# Patient Record
Sex: MALE | Race: BLACK OR AFRICAN AMERICAN | NOT HISPANIC OR LATINO | Employment: OTHER | URBAN - METROPOLITAN AREA
[De-identification: names, ages, dates, MRNs, and addresses within clinical notes are randomized per-mention and may not be internally consistent; named-entity substitution may affect disease eponyms.]

---

## 2017-12-21 PROBLEM — I10 ESSENTIAL HYPERTENSION: Status: ACTIVE | Noted: 2017-12-21

## 2018-03-07 PROBLEM — E78.5 HYPERLIPIDEMIA: Status: ACTIVE | Noted: 2018-03-07

## 2018-03-07 PROBLEM — E11.9 TYPE 2 DIABETES MELLITUS: Status: ACTIVE | Noted: 2018-03-07

## 2018-06-13 PROBLEM — Z00.00 ANNUAL PHYSICAL EXAM: Status: ACTIVE | Noted: 2018-06-13

## 2018-06-13 PROBLEM — R01.0 BENIGN HEART MURMUR: Status: ACTIVE | Noted: 2018-06-13

## 2018-06-13 PROBLEM — Z12.11 SCREENING FOR COLON CANCER: Status: ACTIVE | Noted: 2018-06-13

## 2018-06-13 PROBLEM — Z11.59 ENCOUNTER FOR HEPATITIS C SCREENING TEST FOR LOW RISK PATIENT: Status: ACTIVE | Noted: 2018-06-13

## 2018-06-13 PROBLEM — R05.9 COUGH: Status: ACTIVE | Noted: 2018-06-13

## 2018-06-13 PROBLEM — E04.9 GOITER: Status: ACTIVE | Noted: 2018-06-13

## 2018-06-13 PROBLEM — Z01.89 ENCOUNTER FOR OTHER SPECIFIED SPECIAL EXAMINATIONS: Status: ACTIVE | Noted: 2018-06-13

## 2018-06-13 PROBLEM — Z12.5 SCREENING FOR PROSTATE CANCER: Status: ACTIVE | Noted: 2018-06-13

## 2018-06-13 PROBLEM — Z13.6 ENCOUNTER FOR SCREENING FOR CARDIOVASCULAR DISORDERS: Status: ACTIVE | Noted: 2018-06-13

## 2018-06-25 PROBLEM — E04.1 THYROID NODULE: Status: ACTIVE | Noted: 2018-06-25

## 2018-06-27 PROBLEM — E87.6 HYPOKALEMIA: Status: ACTIVE | Noted: 2018-06-27

## 2018-09-17 PROBLEM — Z13.6 ENCOUNTER FOR SCREENING FOR CARDIOVASCULAR DISORDERS: Status: RESOLVED | Noted: 2018-06-13 | Resolved: 2018-09-17

## 2018-09-17 PROBLEM — Z00.00 ANNUAL PHYSICAL EXAM: Status: RESOLVED | Noted: 2018-06-13 | Resolved: 2018-09-17

## 2019-01-03 PROBLEM — J30.2 SEASONAL ALLERGIC RHINITIS: Status: ACTIVE | Noted: 2019-01-03

## 2019-01-03 PROBLEM — M25.522 ELBOW PAIN, LEFT: Status: ACTIVE | Noted: 2019-01-03

## 2019-01-05 PROBLEM — R80.9 PROTEINURIA: Status: ACTIVE | Noted: 2019-01-05

## 2019-01-05 PROBLEM — N18.1 STAGE 1 CHRONIC KIDNEY DISEASE: Status: ACTIVE | Noted: 2019-01-05

## 2019-05-05 PROBLEM — E87.6 POTASSIUM SERUM DECREASED: Status: ACTIVE | Noted: 2019-05-05

## 2019-08-23 PROBLEM — Z01.89 ENCOUNTER FOR OTHER SPECIFIED SPECIAL EXAMINATIONS: Status: RESOLVED | Noted: 2018-06-13 | Resolved: 2019-08-23

## 2019-08-23 PROBLEM — Z11.59 ENCOUNTER FOR HEPATITIS C SCREENING TEST FOR LOW RISK PATIENT: Status: RESOLVED | Noted: 2018-06-13 | Resolved: 2019-08-23

## 2019-08-23 PROBLEM — Z12.11 SCREENING FOR COLON CANCER: Status: RESOLVED | Noted: 2018-06-13 | Resolved: 2019-08-23

## 2019-08-23 PROBLEM — R05.9 COUGH: Status: RESOLVED | Noted: 2018-06-13 | Resolved: 2019-08-23

## 2019-08-23 PROBLEM — E87.6 POTASSIUM SERUM DECREASED: Status: RESOLVED | Noted: 2019-05-05 | Resolved: 2019-08-23

## 2019-08-23 PROBLEM — Z12.5 SCREENING FOR PROSTATE CANCER: Status: RESOLVED | Noted: 2018-06-13 | Resolved: 2019-08-23

## 2019-08-23 PROBLEM — Z00.00 ANNUAL PHYSICAL EXAM: Status: RESOLVED | Noted: 2018-06-13 | Resolved: 2019-08-23

## 2019-08-23 PROBLEM — E04.1 THYROID NODULE: Status: RESOLVED | Noted: 2018-06-25 | Resolved: 2019-08-23

## 2019-08-23 PROBLEM — E87.6 HYPOKALEMIA: Status: RESOLVED | Noted: 2018-06-27 | Resolved: 2019-08-23

## 2019-08-23 PROBLEM — M25.522 ELBOW PAIN, LEFT: Status: RESOLVED | Noted: 2019-01-03 | Resolved: 2019-08-23

## 2020-01-13 PROBLEM — E11.9 DIABETES MELLITUS WITHOUT COMPLICATION: Chronic | Status: ACTIVE | Noted: 2020-01-13

## 2023-04-27 ENCOUNTER — CLINICAL SUPPORT (OUTPATIENT)
Dept: PULMONOLOGY | Facility: CLINIC | Age: 73
End: 2023-04-27
Payer: MEDICARE

## 2023-04-27 DIAGNOSIS — R06.2 WHEEZING: ICD-10-CM

## 2023-04-27 LAB
BRPFT: ABNORMAL
DLCO ADJ PRE: 23.01 ML/(MIN*MMHG) (ref 18.07–31.93)
DLCO SINGLE BREATH LLN: 18.07
DLCO SINGLE BREATH PRE REF: 92.1 %
DLCO SINGLE BREATH REF: 25
DLCOC SBVA LLN: 2.5
DLCOC SBVA PRE REF: 148.4 %
DLCOC SBVA REF: 3.71
DLCOC SINGLE BREATH LLN: 18.07
DLCOC SINGLE BREATH PRE REF: 92.1 %
DLCOC SINGLE BREATH REF: 25
DLCOVA LLN: 2.5
DLCOVA PRE REF: 148.4 %
DLCOVA PRE: 5.5 ML/(MIN*MMHG*L) (ref 2.5–4.92)
DLCOVA REF: 3.71
DLVAADJ PRE: 5.5 ML/(MIN*MMHG*L) (ref 2.5–4.92)
ERV LLN: -16449.03
ERV PRE REF: 15.1 %
ERV REF: 0.97
FEF 25 75 LLN: 0.64
FEF 25 75 PRE REF: 112.3 %
FEF 25 75 REF: 1.9
FEV1 FVC LLN: 63
FEV1 FVC PRE REF: 99.7 %
FEV1 FVC REF: 76
FEV1 LLN: 1.68
FEV1 PRE REF: 103.6 %
FEV1 REF: 2.48
FRCPLETH LLN: 2.63
FRCPLETH PREREF: 72.3 %
FRCPLETH REF: 3.62
FVC LLN: 2.33
FVC PRE REF: 103.6 %
FVC REF: 3.26
IVC PRE: 2.8 L (ref 2.33–4.19)
IVC SINGLE BREATH LLN: 2.33
IVC SINGLE BREATH PRE REF: 85.8 %
IVC SINGLE BREATH REF: 3.26
MVV LLN: 96
MVV PRE REF: 85.6 %
MVV REF: 113
PEF LLN: 4.91
PEF PRE REF: 102.5 %
PEF REF: 7.43
PRE DLCO: 23.01 ML/(MIN*MMHG) (ref 18.07–31.93)
PRE ERV: 0.15 L (ref -16449.03–16450.97)
PRE FEF 25 75: 2.14 L/S (ref 0.64–3.16)
PRE FET 100: 11.73 SEC
PRE FEV1 FVC: 76.21 % (ref 62.95–89.9)
PRE FEV1: 2.57 L (ref 1.68–3.29)
PRE FRC PL: 2.61 L
PRE FVC: 3.38 L (ref 2.33–4.19)
PRE MVV: 97 L/MIN (ref 96.29–130.27)
PRE PEF: 7.61 L/S (ref 4.91–9.94)
PRE RV: 2.32 L (ref 1.97–3.32)
PRE TLC: 5.72 L (ref 5.59–7.89)
RAW LLN: 3.06
RAW PRE REF: 97.5 %
RAW PRE: 2.98 CMH2O*S/L (ref 3.06–3.06)
RAW REF: 3.06
RV LLN: 1.97
RV PRE REF: 87.7 %
RV REF: 2.64
RVTLC LLN: 33
RVTLC PRE REF: 95.5 %
RVTLC PRE: 40.5 % (ref 33.45–51.41)
RVTLC REF: 42
TLC LLN: 5.59
TLC PRE REF: 84.9 %
TLC REF: 6.74
VA PRE: 4.19 L (ref 6.59–6.59)
VA SINGLE BREATH LLN: 6.59
VA SINGLE BREATH PRE REF: 63.5 %
VA SINGLE BREATH REF: 6.59
VC LLN: 2.33
VC PRE REF: 104.4 %
VC PRE: 3.4 L (ref 2.33–4.19)
VC REF: 3.26
VTGRAWPRE: 2.95 L

## 2023-04-27 PROCEDURE — 94010 BREATHING CAPACITY TEST: CPT | Mod: 26,S$PBB,, | Performed by: INTERNAL MEDICINE

## 2023-04-27 PROCEDURE — 94729 DIFFUSING CAPACITY: CPT | Mod: PBBFAC

## 2023-04-27 PROCEDURE — 94010 BREATHING CAPACITY TEST: CPT | Mod: PBBFAC

## 2023-04-27 PROCEDURE — 94726 PLETHYSMOGRAPHY LUNG VOLUMES: CPT | Mod: PBBFAC

## 2023-04-27 PROCEDURE — 94726 PULM FUNCT TST PLETHYSMOGRAP: ICD-10-PCS | Mod: 26,S$PBB,, | Performed by: INTERNAL MEDICINE

## 2023-04-27 PROCEDURE — 94010 BREATHING CAPACITY TEST: ICD-10-PCS | Mod: 26,S$PBB,, | Performed by: INTERNAL MEDICINE

## 2023-04-27 PROCEDURE — 94729 DIFFUSING CAPACITY: CPT | Mod: 26,S$PBB,, | Performed by: INTERNAL MEDICINE

## 2023-04-27 PROCEDURE — 94729 PR C02/MEMBANE DIFFUSE CAPACITY: ICD-10-PCS | Mod: 26,S$PBB,, | Performed by: INTERNAL MEDICINE

## 2023-04-27 PROCEDURE — 94726 PLETHYSMOGRAPHY LUNG VOLUMES: CPT | Mod: 26,S$PBB,, | Performed by: INTERNAL MEDICINE

## 2023-10-23 PROBLEM — E55.9 VITAMIN D DEFICIENCY: Status: ACTIVE | Noted: 2023-10-23

## 2023-10-23 PROBLEM — N18.31 CHRONIC KIDNEY DISEASE, STAGE 3A: Status: ACTIVE | Noted: 2023-10-23

## 2023-11-14 PROBLEM — M47.816 SPONDYLOSIS OF LUMBAR REGION WITHOUT MYELOPATHY OR RADICULOPATHY: Status: ACTIVE | Noted: 2023-11-14

## 2025-03-17 DIAGNOSIS — M25.512 LEFT SHOULDER PAIN, UNSPECIFIED CHRONICITY: Primary | ICD-10-CM

## 2025-03-18 ENCOUNTER — HOSPITAL ENCOUNTER (OUTPATIENT)
Dept: RADIOLOGY | Facility: HOSPITAL | Age: 75
Discharge: HOME | End: 2025-03-18
Attending: STUDENT IN AN ORGANIZED HEALTH CARE EDUCATION/TRAINING PROGRAM
Payer: MEDICARE

## 2025-03-18 ENCOUNTER — OFFICE VISIT (OUTPATIENT)
Dept: SPORTS MEDICINE | Facility: CLINIC | Age: 75
End: 2025-03-18
Payer: MEDICARE

## 2025-03-18 VITALS — WEIGHT: 198.63 LBS | BODY MASS INDEX: 30.1 KG/M2 | HEIGHT: 68 IN

## 2025-03-18 DIAGNOSIS — M25.512 LEFT SHOULDER PAIN, UNSPECIFIED CHRONICITY: ICD-10-CM

## 2025-03-18 DIAGNOSIS — M67.912 TENDINOPATHY OF LEFT ROTATOR CUFF: ICD-10-CM

## 2025-03-18 DIAGNOSIS — M75.42 SUBACROMIAL IMPINGEMENT OF LEFT SHOULDER: Primary | ICD-10-CM

## 2025-03-18 PROCEDURE — 73030 X-RAY EXAM OF SHOULDER: CPT | Mod: 26,LT,, | Performed by: RADIOLOGY

## 2025-03-18 PROCEDURE — 99999 PR PBB SHADOW E&M-EST. PATIENT-LVL IV: CPT | Mod: PBBFAC,,, | Performed by: STUDENT IN AN ORGANIZED HEALTH CARE EDUCATION/TRAINING PROGRAM

## 2025-03-18 PROCEDURE — 73030 X-RAY EXAM OF SHOULDER: CPT | Mod: TC,LT

## 2025-03-18 PROCEDURE — 99999PBSHW PR PBB SHADOW TECHNICAL ONLY FILED TO HB: Mod: PBBFAC,,,

## 2025-03-18 PROCEDURE — 99214 OFFICE O/P EST MOD 30 MIN: CPT | Mod: PBBFAC,25 | Performed by: STUDENT IN AN ORGANIZED HEALTH CARE EDUCATION/TRAINING PROGRAM

## 2025-03-18 PROCEDURE — 99204 OFFICE O/P NEW MOD 45 MIN: CPT | Mod: S$PBB,25,, | Performed by: STUDENT IN AN ORGANIZED HEALTH CARE EDUCATION/TRAINING PROGRAM

## 2025-03-18 PROCEDURE — 20610 DRAIN/INJ JOINT/BURSA W/O US: CPT | Mod: PBBFAC,LT | Performed by: STUDENT IN AN ORGANIZED HEALTH CARE EDUCATION/TRAINING PROGRAM

## 2025-03-18 PROCEDURE — 97110 THERAPEUTIC EXERCISES: CPT | Mod: PBBFAC | Performed by: STUDENT IN AN ORGANIZED HEALTH CARE EDUCATION/TRAINING PROGRAM

## 2025-03-18 RX ORDER — TRIAMCINOLONE ACETONIDE 40 MG/ML
40 INJECTION, SUSPENSION INTRA-ARTICULAR; INTRAMUSCULAR
Status: DISCONTINUED | OUTPATIENT
Start: 2025-03-18 | End: 2025-03-18 | Stop reason: HOSPADM

## 2025-03-18 RX ADMIN — TRIAMCINOLONE ACETONIDE 40 MG: 200 INJECTION, SUSPENSION INTRA-ARTICULAR; INTRAMUSCULAR at 11:03

## 2025-03-18 NOTE — PROCEDURES
Large Joint Aspiration/Injection: L subacromial bursa    Date/Time: 3/18/2025 11:30 AM    Performed by: Jeronimo Hanson MD  Authorized by: Jeronimo Hanson MD    Consent Done?:  Yes (Verbal)  Indications:  Pain  Site marked: the procedure site was marked    Timeout: prior to procedure the correct patient, procedure, and site was verified    Prep: patient was prepped and draped in usual sterile fashion      Local anesthesia used?: Yes    Anesthesia:  Local infiltration  Local anesthetic:  Lidocaine 1% without epinephrine    Details:  Needle Size:  22 G  Ultrasonic Guidance for needle placement?: No    Approach:  Posterior  Location:  Shoulder  Site:  L subacromial bursa  Medications:  40 mg triamcinolone acetonide 40 mg/mL  Patient tolerance:  Patient tolerated the procedure well with no immediate complications

## 2025-03-18 NOTE — PROGRESS NOTES
[facility]     Shoulder Initial Visit         3/18/2025    Referring MD: Self, Aaareferral    Chief Complaint   Patient presents with    Left Shoulder - Pain       History of Present Illness:   Sukh Martinez is a 74 y.o. right-hand dominant male who presents with left shoulder pain and dysfunction.    Onset of the symptoms was approximately 1 month ago.      Inciting event: No specific injury or trauma, insidious onset of symptoms.     Current symptoms include left shoulder pain mostly localized to the lateral shoulder. He reports he is unable to fully reach over his head with the left shoulder. He rates his pain a 6/10 today and describes it as intermittent aching, throbbing, sharp pain. Denies any previous history of pathology to the shoulder or instability. He does note some tingling in the neck as well.       Pain is aggravated by overhead movement and lifting. Pain is also worse at night.     Evaluation to date: X-Ray,      Treatment to date: Rest, activity modification, icy hot, Tylenol. Reports that PCP has told him to avoid anti-inflammatories      Past Medical History:   Past Medical History:   Diagnosis Date    Aldosterone deficiency     Encounter for general adult medical examination without abnormal findings 05/16/2017    Essential hypertension     Goiter diffuse     Heart murmur     lamotte    Hyperlipidemia     Hypokalemia     Proteinuria     Type 2 diabetes mellitus        Past Surgical History:   Past Surgical History:   Procedure Laterality Date    APPENDECTOMY      PROSTATE BIOPSY  05/28/2024       Medications:  Patient's Medications   New Prescriptions    No medications on file   Previous Medications    ALBUTEROL (VENTOLIN HFA) 90 MCG/ACTUATION INHALER    Inhale 1-2 puffs into the lungs every 4 to 6 hours as needed for Wheezing or Shortness of Breath. Rescue    AMLODIPINE-OLMESARTAN (SLIM) 10-40 MG PER TABLET    Take 1 tablet by mouth once daily.    ATORVASTATIN (LIPITOR) 20 MG  TABLET    Take 1 tablet (20 mg total) by mouth every evening.    AZITHROMYCIN (ZITHROMAX Z-SADE) 250 MG TABLET    2 tablets today then 1 tablet daily x4 days    BLOOD SUGAR DIAGNOSTIC STRP    To check BG 2 times daily, to use with insurance preferred meter    BLOOD-GLUCOSE METER (ONETOUCH ULTRA2 METER) MISC    USE AS DIRECTED    BLOOD-GLUCOSE METER KIT    To check BG 2 times daily, to use with insurance preferred meter    CHOLECALCIFEROL, VITAMIN D3, 1,250 MCG (50,000 UNIT) CAPSULE    Take 1 capsule (50,000 Units total) by mouth every 7 days.    FLUTICASONE-SALMETEROL DISKUS INHALER 100-50 MCG    Inhale 1 puff into the lungs 2 (two) times daily. Controller    HYDRALAZINE (APRESOLINE) 50 MG TABLET    Take 50 mg by mouth 2 (two) times daily.    LANCETS MISC    To check BG 2 times daily, to use with insurance preferred meter    METFORMIN (GLUCOPHAGE-XR) 500 MG ER 24HR TABLET    Take 2 tablets (1,000 mg total) by mouth every evening.    MONTELUKAST (SINGULAIR) 10 MG TABLET    Take 1 tablet (10 mg total) by mouth every evening.    NEBIVOLOL (BYSTOLIC) 20 MG TAB    Take 1 tablet (20 mg total) by mouth once daily.    OLMESARTAN-AMLODIPIN-HCTHIAZID 40-10-25 MG TAB    Take 1 tablet by mouth once daily.    POTASSIUM CHLORIDE SA (K-DUR,KLOR-CON) 20 MEQ TABLET    TAKE 3  BY MOUTH ONCE DAILY    SILDENAFIL (VIAGRA) 100 MG TABLET    Take 100 mg by mouth as needed.    SPIRONOLACTONE (ALDACTONE) 25 MG TABLET    Take 25 mg by mouth once daily.    TADALAFIL (CIALIS) 20 MG TAB    Take 20 mg by mouth.    TAMSULOSIN (FLOMAX) 0.4 MG CAP    Take 1 capsule (0.4 mg total) by mouth every morning.   Modified Medications    No medications on file   Discontinued Medications    No medications on file       Allergies: Review of patient's allergies indicates:  No Known Allergies    Social History:   Home town: [place], [place]  Alcohol use: He reports current alcohol use of about 14.0 standard drinks of alcohol per week.  Tobacco use: He reports that  "he has quit smoking. He has never used smokeless tobacco.    Review of systems:  History of recent illness, fevers, shakes, or chills: no  History of cardiac problems or chest pain: no  History of pulmonary problems or asthma: no  History of diabetes: Yes  History of prior dvt or clotting problems: no  History of sleep apnea: no      Physical Examination:  Estimated body mass index is 30.2 kg/m² as calculated from the following:    Height as of this encounter: 5' 8" (1.727 m).    Weight as of this encounter: 90.1 kg (198 lb 10.2 oz).    General  Healthy appearing male in no acute distress  Alert and oriented, normal mood, appropriate affect    Shoulder Examination:  Patient is alert and oriented, no distress. Skin is intact. Neuro is normal with no focal motor or sensory findings.    Cervical exam is unremarkable. Intact cervical ROM. Negative Spurling's test    Physical Exam:  RIGHT    LEFT    Scap Dyskinesis/Winging (-)    (-)    Tenderness:          Greater Tuberosity             (-)    +  Bicipital Groove  (-)    (-)  AC joint   (-)    (-)  Other:     ROM:  Forward Elevation 180    160/180  Abduction  120    110  ER (at side)  70    60  IR   L1    L5    Strength:   Supraspinatus  5/5    4/5  Infraspinatus  5/5    5/5  Subscap / IR  5/5    5/5     Special Tests:   Neer:    (-)    (-)   Dowell:   (-)    +   SS Stress:   (-)    +   Bear Hug:   (-)    (-)   [place]'s:   (-)    +   Resisted Thrower's:   (-)    (-)   Speed's   (-)    +   Cross Arm Abduction:  (-)    (-)    Neurovascular examination  - Motor grossly intact bilaterally to shoulder abduction, elbow flexion and extension, wrist flexion and extension, and intrinsic hand musculature  - Sensation intact to light touch bilaterally in axillary, median, radial, and ulnar distributions  - Symmetrical radial pulses      Imaging:  XR Results:  X-Ray Shoulder 2 or More Views Left  Narrative: EXAM:  XR SHOULDER COMPLETE 2 OR MORE VIEWS LEFT.    CLINICAL HISTORY: " [M25.512]-Pain in left shoulder.    COMPARISON STUDIES: None.    FINDINGS:  There is no fracture or dislocation.    There is no significant arthritic change.    Mild arthritic change acromioclavicular joint.    There are no significant soft tissue findings.  Impression: No acute findings.    Finalized on: 3/18/2025 3:02 PM By:  Stevie Celestin MD  [facility]# [id number]      2025-03-18 15:04:[id number]     [facility]          MRI Results:  No results found for this or any previous visit.      CT Results:  No results found for this or any previous visit.      Physician Read: I agree with the above impression.      Impression:  74 y.o. male with left shoulder subacromial impingement, rotator cuff tendinopathy         Plan:  Discussed diagnosis and treatment options with patient today. His symptoms and physical exam are most consistent with left shoulder subacromial impingement and rotator cuff tendinopathy.   We went over non-operative treatment options in the form of rest, activity modifications, oral anti-inflammatories, corticosteroid injections, and physical therapy/physician directed home exercise program.   I recommend proceeding with left shoulder corticosteroid injection into the subacromial space and home exercise program. May continue to use Tylenol as needed for intermittent relief as well since PCP advised to avoid anti-inflammatories.  The patient is in agreement with this plan.   Procedure performed today and patient tolerated the procedure well with no immediate complications.   At least 15 minutes were spent developing, teaching, and performing a home exercise program.  A written summary was provided and all questions were answered.  This service was performed under the direction of Jeronimo Hanson MD.  CPT 68181-YT.  Follow up with me in 4-6 weeks as needed.            Jeronimo Hanson MD    I, Nir Olsen, acted as a scribe for Jeronimo Hanson MD for the duration of this office  visit.    This note was generated with the assistance of ambient listening technology. Verbal consent was obtained by the patient and accompanying visitor(s) for the recording of patient appointment to facilitate this note. I attest to having reviewed and edited the generated note for accuracy, though some syntax or spelling errors may persist. Please contact the author of this note for any clarification.